# Patient Record
Sex: FEMALE | Race: WHITE
[De-identification: names, ages, dates, MRNs, and addresses within clinical notes are randomized per-mention and may not be internally consistent; named-entity substitution may affect disease eponyms.]

---

## 2018-04-22 ENCOUNTER — HOSPITAL ENCOUNTER (OUTPATIENT)
Dept: HOSPITAL 50 - VM.ED | Age: 83
Setting detail: OBSERVATION
LOS: 2 days | Discharge: HOME | End: 2018-04-24
Attending: PHYSICIAN ASSISTANT | Admitting: PHYSICIAN ASSISTANT
Payer: MEDICARE

## 2018-04-22 DIAGNOSIS — Z88.1: ICD-10-CM

## 2018-04-22 DIAGNOSIS — Z88.2: ICD-10-CM

## 2018-04-22 DIAGNOSIS — I12.9: ICD-10-CM

## 2018-04-22 DIAGNOSIS — E03.9: ICD-10-CM

## 2018-04-22 DIAGNOSIS — E11.22: ICD-10-CM

## 2018-04-22 DIAGNOSIS — Z79.899: ICD-10-CM

## 2018-04-22 DIAGNOSIS — Z91.09: ICD-10-CM

## 2018-04-22 DIAGNOSIS — N18.3: ICD-10-CM

## 2018-04-22 DIAGNOSIS — Z88.8: ICD-10-CM

## 2018-04-22 DIAGNOSIS — E86.0: ICD-10-CM

## 2018-04-22 DIAGNOSIS — E78.00: ICD-10-CM

## 2018-04-22 DIAGNOSIS — Z88.0: ICD-10-CM

## 2018-04-22 DIAGNOSIS — H81.13: Primary | ICD-10-CM

## 2018-04-22 LAB
CHLORIDE SERPL-SCNC: 100 MMOL/L (ref 98–107)
SODIUM SERPL-SCNC: 137 MMOL/L (ref 136–145)

## 2018-04-22 PROCEDURE — G0378 HOSPITAL OBSERVATION PER HR: HCPCS

## 2018-04-22 RX ADMIN — POTASSIUM CHLORIDE SCH MEQ: 750 TABLET, FILM COATED, EXTENDED RELEASE ORAL at 19:25

## 2018-04-22 NOTE — EDM.PDOC
ED HPI GENERAL MEDICAL PROBLEM





- General


Chief Complaint: General


Stated Complaint: dizzy, forgetfulness


Time Seen by Provider: 04/22/18 10:52


Source of Information: Reports: Patient, Family


History Limitations: Reports: No Limitations





- History of Present Illness


INITIAL COMMENTS - FREE TEXT/NARRATIVE: 





PtEsteban presents to ER with complaints of vertigo. She has been experiencing this 

for several months but it is worse today. She denies striking her head. She has 

been seen by ENT and they suggested vestibular therapy for BPPV. She has so far 

refused to do it, as she doesn't want to "move her head and get dizzy".


She denies any chest pain or shortness of breath. No weakness. Denies any 

blurred vision, difficulty with speech or ambulation.


Location: Reports: Generalized





- Related Data


 Allergies











Allergy/AdvReac Type Severity Reaction Status Date / Time


 


adhesive tape Allergy  Other Verified 04/22/18 10:55


 


azithromycin Allergy  Other Verified 04/22/18 10:52


 


cephalexin Allergy  Other Verified 04/22/18 10:55


 


Cephalosporins Allergy  Other Verified 04/22/18 13:14


 


ciprofloxacin Allergy  Other Verified 04/22/18 10:55


 


codeine Allergy  Other Verified 04/22/18 10:55


 


doxycycline Allergy  Other Verified 04/22/18 10:55


 


erythromycin base Allergy  Other Verified 04/22/18 10:55


 


hydrocodone Allergy  Other Verified 04/22/18 10:55


 


hydrocortisone Allergy  Other Verified 04/22/18 10:55


 


lidocaine Allergy  Other Verified 04/22/18 10:55


 


metformin Allergy  Other Verified 04/22/18 10:55


 


oxymetazoline Allergy  Other Verified 04/22/18 10:55


 


Penicillins Allergy  Other Verified 04/22/18 10:55


 


piroxicam Allergy  Other Verified 04/22/18 10:55


 


sulfamethoxazole Allergy  Other Verified 04/22/18 10:55


 


zolpidem Allergy  Other Verified 04/22/18 10:55











Home Meds: 


 Home Meds





Levothyroxine [Sythroid] 100 mcg PO DAILY 04/22/18 [History]


Lisinopril/Hydrochlorothiazide [Zestoretic 20-12.5 mg Tablet] 12.5 mg PO DAILY 

04/22/18 [History]


Metoprolol Tartrate 50 mg PO BID 04/22/18 [History]


Potassium Chloride 10 meq PO BID 04/22/18 [History]


Trospium [Sanctura] 20 mg PO BID 04/22/18 [History]


atorvaSTATin [Lipitor] 0.5 tab PO DAILY 04/22/18 [History]











Past Medical History


HEENT History: Reports: Allergic Rhinitis


Cardiovascular History: Reports: High Cholesterol, Hypertension


Genitourinary History: Reports: Other (See Below)


Other Genitourinary History: Stage III chronic kidney disease


Neurological History: Reports: Vertigo


Other Neuro History: benign paroxysmal positional vertigo


Psychiatric History: Reports: Anxiety, Depression


Endocrine/Metabolic History: Reports: Diabetes, Type II, Hypothyroidism





Social & Family History





- Tobacco Use


Smoking Status *Q: Never Smoker


Second Hand Smoke Exposure: No





- Caffeine Use


Caffeine Use: Reports: Coffee





- Recreational Drug Use


Recreational Drug Use: No





ED ROS GENERAL





- Review of Systems


Review Of Systems: See Below


Constitutional: Reports: No Symptoms


HEENT: Reports: No Symptoms


Respiratory: Reports: No Symptoms


Cardiovascular: Reports: No Symptoms


Endocrine: Reports: No Symptoms


GI/Abdominal: Reports: No Symptoms


: Reports: No Symptoms


Musculoskeletal: Reports: No Symptoms


Skin: Reports: No Symptoms


Neurological: Reports: No Symptoms


Psychiatric: Reports: No Symptoms


Hematologic/Lymphatic: Reports: Other


Immunologic: Reports: No Symptoms





ED EXAM, GENERAL





- Physical Exam


Exam: See Below


Exam Limited By: No Limitations


General Appearance: Alert, WD/WN, No Apparent Distress


Eye Exam: Bilateral Eye: EOMI, Normal Fundi, Normal Inspection, PERRL


Ears: Normal External Exam, Normal Canal, Hearing Grossly Normal, Normal TMs


Ear Exam: Bilateral Ear: Auricle Normal, Canal Normal, TM normal


Nose: Normal Inspection, Normal Mucosa, No Blood


Throat/Mouth: Normal Inspection, Normal Lips, Normal Teeth, Normal Gums, Normal 

Oropharynx, Normal Voice, No Airway Compromise


Head: Atraumatic, Normocephalic


Neck: Normal Inspection, Supple, Non-Tender, Full Range of Motion


Respiratory/Chest: No Respiratory Distress, Lungs Clear, Normal Breath Sounds, 

No Accessory Muscle Use, Chest Non-Tender


Cardiovascular: Normal Peripheral Pulses, Regular Rate, Rhythm, No Edema, No 

Gallop, No JVD, No Murmur, No Rub


Peripheral Pulses: 4+: Radial (L), Radial (R), Femoral (L), Femoral (R)


GI/Abdominal: Normal Bowel Sounds, Soft, Non-Tender, No Organomegaly, No 

Distention, No Abnormal Bruit, No Mass


 (Female) Exam: Deferred


Rectal (Female) Exam: Deferred


Back Exam: Normal Inspection, Full Range of Motion, NT


Extremities: Normal Inspection, Normal Range of Motion, Non-Tender, Normal 

Capillary Refill, No Pedal Edema


Neurological: Alert, Oriented, CN II-XII Intact, Normal Cognition, Normal Gait, 

Normal Reflexes, No Motor/Sensory Deficits


Psychiatric: Normal Affect, Normal Mood


Skin Exam: Warm, Dry, Intact, Normal Color, No Rash


Lymphatic: No Adenopathy





EKG INTERPRETATION


Rhythm: NSR


Axis: Normal


P-Wave: Present


QRS: Normal


ST-T: Normal


QT: Normal





Course





- Vital Signs


Last Recorded V/S: 





 Last Vital Signs











Temp  36.7 C   04/22/18 16:22


 


Pulse  96   04/22/18 16:22


 


Resp  16   04/22/18 16:22


 


BP  140/70   04/22/18 16:22


 


Pulse Ox  93 L  04/22/18 16:22














- Orders/Labs/Meds


Orders: 





 Active Orders 24 hr











 Category Date Time Status


 


 Chest 1V Frontal [CR] Stat Exams  04/22/18 11:15 Taken


 


 Head wo Cont [CT] Stat Exams  04/22/18 11:14 Taken


 


 Sodium Chloride 0.9% [Saline Flush] Med  04/22/18 10:52 Active





 10 ml FLUSH ASDIRECTED PRN   


 


 Peripheral IV Insertion Adult [OM.PC] Routine Oth  04/22/18 11:13 Ordered








 Medication Orders





Dimenhydrinate (Driminate)  50 mg PO Q4H PRN


   PRN Reason: Dizziness


Lactated Ringer's (Ringers, Lactated)  1,000 mls @ 75 mls/hr IV ASDIRECTED [facility]


   Last Admin: 04/22/18 15:25  Dose: 75 mls/hr


Meclizine HCl (Antivert)  25 mg PO BID PRN


   PRN Reason: Dizziness


Ondansetron HCl (Zofran)  4 mg IVPUSH Q8H PRN


   PRN Reason: Nausea


Sodium Chloride (Saline Flush)  10 ml FLUSH ASDIRECTED PRN


   PRN Reason: Keep Vein Open








Labs: 





 Laboratory Tests











  04/22/18 04/22/18 04/22/18 Range/Units





  11:25 11:25 11:25 


 


WBC  6.4    (4.0-10.0)  x10^3/uL


 


RBC  4.10    (4.00-5.50)  x10^6/uL


 


Hgb  12.4    (12.0-16.0)  g/dL


 


Hct  37.3    (33.0-47.0)  %


 


MCV  91.0    (78.0-93.0)  fL


 


MCH  30.2    (26.0-32.0)  pg


 


MCHC  33.2    (32.0-36.0)  g/dL


 


RDW Coeff of Mat  14.3    (10.0-15.0)  %


 


Plt Count  180    (130-400)  x10^3/uL


 


Neut % (Auto)  68.5    (50.0-80.0)  %


 


Lymph % (Auto)  17.6 L    (25.0-50.0)  %


 


Mono % (Auto)  8.6    (2.0-11.0)  %


 


Eos % (Auto)  4.8 H    (0.0-4.0)  %


 


Baso % (Auto)  0.5    (0.2-1.2)  %


 


PT   11.2   (9.8-11.8)  SEC


 


INR   1.0 L   (2.0-3.5)  


 


Sodium    137  (136-145)  mmol/L


 


Potassium    3.9  (3.5-5.1)  mmol/L


 


Chloride    100  ()  mmol/L


 


Carbon Dioxide    26  (21-32)  mmol/L


 


Anion Gap    14.9  (10-20)  mmol/L


 


BUN    17  (7-18)  mg/dL


 


Creatinine    1.3 H  (0.55-1.02)  mg/dL


 


Est Cr Clr Drug Dosing    TNP  


 


Estimated GFR (MDRD)    39  


 


Glucose    205 H  ()  mg/dL


 


Calcium    8.9  (8.5-10.1)  mg/dL


 


Corrected Calcium    9.54  (8.5-10.1)  mg/dL


 


Phosphorus    3.4  (2.6-4.7)  mg/dL


 


Magnesium    1.8  (1.8-2.4)  mg/dL


 


Total Bilirubin    0.7  (0.2-1.0)  mg/dL


 


AST    19  (15-37)  U/L


 


ALT    20  (14-59)  U/L


 


Alkaline Phosphatase    168 H  ()  U/L


 


Troponin I    < 0.017  (<=0.056)  ng/mL


 


C-Reactive Protein    1.3 H  (<=0.9)  mg/dL


 


Total Protein    6.8  (6.4-8.2)  g/dL


 


Albumin    3.2 L  (3.4-5.0)  g/dL


 


Globulin    3.6  


 


Albumin/Globulin Ratio    0.89  


 


TSH, Ultra Sensitive    2.013  (0.358-3.74)  uIU/mL











Meds: 





Medications











Generic Name Dose Route Start Last Admin





  Trade Name Freq  PRN Reason Stop Dose Admin


 


Dimenhydrinate  50 mg  04/22/18 14:06  





  Driminate  PO   





  Q4H PRN   





  Dizziness   





     





     





     


 


Lactated Ringer's  1,000 mls @ 75 mls/hr  04/22/18 14:15  04/22/18 15:25





  Ringers, Lactated  IV   75 mls/hr





  ASDIRECTED HUSSAIN   Administration





     





     





     





     


 


Meclizine HCl  25 mg  04/22/18 14:08  





  Antivert  PO   





  BID PRN   





  Dizziness   





     





     





     


 


Ondansetron HCl  4 mg  04/22/18 14:09  





  Zofran  IVPUSH   





  Q8H PRN   





  Nausea   





     





     





     


 


Sodium Chloride  10 ml  04/22/18 10:52  





  Saline Flush  FLUSH   





  ASDIRECTED PRN   





  Keep Vein Open   





     





     





     














Discontinued Medications














Generic Name Dose Route Start Last Admin





  Trade Name Freq  PRN Reason Stop Dose Admin


 


Dimenhydrinate  50 mg  04/22/18 11:16  04/22/18 11:24





  Driminate  PO  04/22/18 11:17  50 mg





  ONETIME ONE   Administration





     





     





     





     


 


Lactated Ringer's  1,000 mls @ 500 mls/hr  04/22/18 11:30  04/22/18 11:25





  Ringers, Lactated  IV   500 mls/hr





  ASDIRECTED HUSSAIN   Administration





     





     





     





     


 


Meclizine HCl  25 mg  04/22/18 11:16  04/22/18 11:24





  Antivert  PO  04/22/18 11:17  25 mg





  ONETIME ONE   Administration





     





     





     





     


 


Ondansetron HCl  4 mg  04/22/18 11:16  04/22/18 11:25





  Zofran  IVPUSH  04/22/18 11:17  4 mg





  ONETIME ONE   Administration





     





     





     





     














- Radiology Interpretation


Free Text/Narrative:: 





CT brain negative for acute pathology








Departure





- Departure


Time of Disposition: 12:37


Disposition: Admitted As Inpatient 66


Clinical Impression: 


 Dehydration, BPPV (benign paroxysmal positional vertigo)








- Discharge Information





- My Orders


Last 24 Hours: 





My Active Orders





04/22/18 10:52


Sodium Chloride 0.9% [Saline Flush]   10 ml FLUSH ASDIRECTED PRN 





04/22/18 11:13


Peripheral IV Insertion Adult [OM.PC] Routine 





04/22/18 11:14


Head wo Cont [CT] Stat 





04/22/18 11:15


Chest 1V Frontal [CR] Stat 














- Assessment/Plan


Last 24 Hours: 





My Active Orders





04/22/18 10:52


Sodium Chloride 0.9% [Saline Flush]   10 ml FLUSH ASDIRECTED PRN 





04/22/18 11:13


Peripheral IV Insertion Adult [OM.PC] Routine 





04/22/18 11:14


Head wo Cont [CT] Stat 





04/22/18 11:15


Chest 1V Frontal [CR] Stat

## 2018-04-23 LAB
CHLORIDE SERPL-SCNC: 104 MMOL/L (ref 98–107)
SODIUM SERPL-SCNC: 139 MMOL/L (ref 136–145)

## 2018-04-23 RX ADMIN — POTASSIUM CHLORIDE SCH MEQ: 750 TABLET, FILM COATED, EXTENDED RELEASE ORAL at 19:43

## 2018-04-23 RX ADMIN — POTASSIUM CHLORIDE SCH MEQ: 750 TABLET, FILM COATED, EXTENDED RELEASE ORAL at 07:19

## 2018-04-23 NOTE — PCM.PN
- General Info


Date of Service: 04/23/18


Subjective Update: 





Pt. is feeling somewhat better today. Her Sanctura was stopped as this is an 

anticholinergic medication. She saw PT this AM and the [place]-Hallpike tests were 

positive bilaterally. PT did the Epley maneuver with the pt. which did provoke 

vertigo nausea and vomiting. Pt. was given nausea zofran and with be worked 

with this afternoon from a vestibular standpoint.


Pt. is eating. No vomiting or diarrhea.


Functional Status: Reports: Pain Controlled





- Review of Systems


General: Reports: No Symptoms


HEENT: Reports: No Symptoms


Pulmonary: Reports: No Symptoms


Cardiovascular: Reports: No Symptoms


Gastrointestinal: Reports: No Symptoms


Genitourinary: Reports: No Symptoms


Musculoskeletal: Reports: No Symptoms


Skin: Reports: No Symptoms


Neurological: Reports: Other (positive marya-hallpike. Denies paresthesia or 

dysarthria.)


Psychiatric: Reports: No Symptoms





- Patient Data


Vitals - Most Recent: 


 Last Vital Signs











Temp  36.6 C   04/23/18 13:54


 


Pulse  65   04/23/18 13:54


 


Resp  16   04/23/18 13:54


 


BP  125/58 L  04/23/18 13:54


 


Pulse Ox  93 L  04/23/18 13:54











Weight - Most Recent: 68.152 kg


I&O - Last 24 Hours: 


 Intake & Output











 04/23/18 04/23/18 04/23/18





 06:59 14:59 22:59


 


Intake Total 1352 660 


 


Output Total 1400 100 900


 


Balance -48 560 -900











Lab Results Last 24 Hours: 


 Laboratory Results - last 24 hr











  04/22/18 04/23/18 04/23/18 Range/Units





  16:30 06:40 06:40 


 


WBC   7.1   (4.0-10.0)  x10^3/uL


 


RBC   3.58 L   (4.00-5.50)  x10^6/uL


 


Hgb   10.9 L D   (12.0-16.0)  g/dL


 


Hct   33.7   (33.0-47.0)  %


 


MCV   94.1 H D   (78.0-93.0)  fL


 


MCH   30.4   (26.0-32.0)  pg


 


MCHC   32.3   (32.0-36.0)  g/dL


 


RDW Coeff of Mat   14.3   (10.0-15.0)  %


 


Plt Count   183   (130-400)  x10^3/uL


 


Neut % (Auto)   61.5   (50.0-80.0)  %


 


Lymph % (Auto)   25.0   (25.0-50.0)  %


 


Mono % (Auto)   8.7   (2.0-11.0)  %


 


Eos % (Auto)   4.5 H   (0.0-4.0)  %


 


Baso % (Auto)   0.3   (0.2-1.2)  %


 


Sodium    139  (136-145)  mmol/L


 


Potassium    4.5  (3.5-5.1)  mmol/L


 


Chloride    104  ()  mmol/L


 


Carbon Dioxide    30  (21-32)  mmol/L


 


Anion Gap    9.5 L  (10-20)  mmol/L


 


BUN    15  (7-18)  mg/dL


 


Creatinine    1.2 H  (0.55-1.02)  mg/dL


 


Est Cr Clr Drug Dosing    22.83  mL/min


 


Estimated GFR (MDRD)    42  


 


Glucose    155 H  ()  mg/dL


 


Calcium    8.5  (8.5-10.1)  mg/dL


 


Corrected Calcium    9.62  (8.5-10.1)  mg/dL


 


Total Bilirubin    0.6  (0.2-1.0)  mg/dL


 


AST    17  (15-37)  U/L


 


ALT    18  (14-59)  U/L


 


Alkaline Phosphatase    135 H  ()  U/L


 


Total Protein    5.7 L  (6.4-8.2)  g/dL


 


Albumin    2.6 L  (3.4-5.0)  g/dL


 


Globulin    3.1  


 


Albumin/Globulin Ratio    0.84  


 


Urine Color  Yellow    (YELLOW)  


 


Urine Appearance  Slightly cloudy H    (CLEAR)  


 


Urine pH  5.0    (5.0-8.0)  


 


Ur Specific Gravity  <=1.005    


 


Urine Protein  Negative    (NEGATIVE)  mg/dL


 


Urine Glucose (UA)  Negative    (NEGATIVE)  mg/dL


 


Urine Ketones  Negative    (NEGATIVE)  mg/dL


 


Urine Occult Blood  Trace-lysed H    (NEGATIVE)  


 


Urine Nitrite  Negative    (NEGATIVE)  


 


Urine Bilirubin  Negative    (NEGATIVE)  


 


Urine Urobilinogen  0.2    (0.2)  EU/dL


 


Ur Leukocyte Esterase  Trace H    (NEGATIVE)  


 


Urine RBC  0-5    (NOT SEEN)  /HPF


 


Urine WBC  0-5    (NOT SEEN)  /HPF


 


Ur Squamous Epith Cells  Few H    (NEGATIVE)  /HPF


 


Amorphous Sediment  Few    


 


Urine Bacteria  Few H    (NEGATIVE)  /HPF


 


Urine Mucus  Rare H    (NEGATIVE)  /LPF











Med Orders - Current: 


 Current Medications





Atorvastatin Calcium (Lipitor)  5 mg PO DAILY [facility]


   Last Admin: 04/23/18 07:20 Dose:  5 mg


Dimenhydrinate (Driminate)  50 mg PO Q4H PRN


   PRN Reason: Dizziness


   Last Admin: 04/22/18 19:25 Dose:  50 mg


Hydrochlorothiazide (Hydrochlorothiazide)  12.5 mg PO DAILY [facility]


   Last Admin: 04/23/18 07:20 Dose:  12.5 mg


Levothyroxine Sodium (Synthroid)  100 mcg PO DAILY@0700 [facility]


Lisinopril (Prinivil)  20 mg PO DAILY [facility]


   Last Admin: 04/23/18 07:20 Dose:  20 mg


Meclizine HCl (Antivert)  25 mg PO BID PRN


   PRN Reason: Dizziness


   Last Admin: 04/23/18 07:20 Dose:  25 mg


Metoclopramide HCl (Reglan)  5 mg IVPUSH Q4H PRN


   PRN Reason: Nausea/Vomiting


   Last Admin: 04/23/18 15:19 Dose:  5 mg


Metoprolol Tartrate (Lopressor)  50 mg PO BID [facility]


   Last Admin: 04/23/18 07:19 Dose:  50 mg


Ondansetron HCl (Zofran)  4 mg IVPUSH Q8H PRN


   PRN Reason: Nausea


Ondansetron HCl (Zofran)  4 mg IVPUSH Q8H PRN


   PRN Reason: Nausea


Potassium Chloride (Klor-Con 10)  10 meq PO BID [facility]


   Last Admin: 04/23/18 07:19 Dose:  10 meq


Sodium Chloride (Saline Flush)  10 ml FLUSH ASDIRECTED PRN


   PRN Reason: Keep Vein Open





Discontinued Medications





Dimenhydrinate (Driminate)  50 mg PO ONETIME ONE


   Stop: 04/22/18 11:17


   Last Admin: 04/22/18 11:24 Dose:  50 mg


Lactated Ringer's (Ringers, Lactated)  1,000 mls @ 500 mls/hr IV ASDIRECTED [facility]


   Last Admin: 04/22/18 11:25 Dose:  500 mls/hr


Lactated Ringer's (Ringers, Lactated)  1,000 mls @ 75 mls/hr IV ASDIRECTED [facility]


   Last Admin: 04/23/18 05:16 Dose:  75 mls/hr


Ibuprofen (Motrin)  400 mg PO ONETIME STA


   Stop: 04/23/18 05:06


   Last Admin: 04/23/18 05:19 Dose:  400 mg


Levothyroxine Sodium (Synthroid)  100 mcg PO ACBRK HUSSAIN


   Last Admin: 04/23/18 06:27 Dose:  100 mcg


Meclizine HCl (Antivert)  25 mg PO ONETIME ONE


   Stop: 04/22/18 11:17


   Last Admin: 04/22/18 11:24 Dose:  25 mg


Ondansetron HCl (Zofran)  4 mg IVPUSH ONETIME ONE


   Stop: 04/22/18 11:17


   Last Admin: 04/22/18 11:25 Dose:  4 mg


Ondansetron HCl (Zofran)  4 mg IVPUSH ONETIME ONE


   Stop: 04/23/18 10:25


   Last Admin: 04/23/18 10:40 Dose:  4 mg











- Exam


Quality Assessment: No: Skin Breakdown


General: Alert, Oriented


HEENT: Pupils Equal, Pupils Reactive, EOMI, Mucous Membr. Moist/Pink


Neck: Supple


Lungs: Clear to Auscultation, Normal Respiratory Effort


Cardiovascular: Regular Rate, Regular Rhythm


GI/Abdominal Exam: Normal Bowel Sounds, Soft, Non-Tender, No Organomegaly, No 

Distention, No Abnormal Bruit, No Mass, Pelvis Stable


 (Female) Exam: Deferred


Back Exam: Normal Inspection, Full Range of Motion


Extremities: Normal Inspection, Normal Range of Motion, Non-Tender, No Pedal 

Edema, Normal Capillary Refill


Peripheral Pulses: 4+: Radial (L), Radial (R)


Skin: Warm, Dry, Intact


Neurological: No New Focal Deficit


Psy/Mental Status: Alert, Normal Affect, Normal Mood





- Problem List & Annotations


(1) BPPV (benign paroxysmal positional vertigo)


SNOMED Code(s): 045656182


   Code(s): H81.10 - BENIGN PAROXYSMAL VERTIGO, UNSPECIFIED EAR   Status: Acute

   Current Visit: Yes   





(2) CKD (chronic kidney disease) stage 3, GFR 30-59 ml/min


SNOMED Code(s): 824312867


   Code(s): N18.3 - CHRONIC KIDNEY DISEASE, STAGE 3 (MODERATE)   Status: 

Chronic   Current Visit: Yes   





- Problem List Review


Problem List Initiated/Reviewed/Updated: Yes





- My Orders


Last 24 Hours: 


My Active Orders





04/22/18 20:00


Metoprolol Tartrate [Lopressor]   50 mg PO BID 


Potassium Chloride [Klor-Con 10]   10 meq PO BID 





04/22/18 Dinner


Regular Diet [DIET] 





04/23/18 08:00


Hydrochlorothiazide   12.5 mg PO DAILY 


Lisinopril [Prinivil]   20 mg PO DAILY 


atorvaSTATin [Lipitor]   5 mg PO DAILY 





04/23/18 13:52


Metoclopramide [Reglan]   5 mg IVPUSH Q4H PRN 





04/23/18 13:53


Ondansetron [Zofran]   4 mg IVPUSH Q8H PRN 





04/24/18 07:00


Levothyroxine [Synthroid]   100 mcg PO DAILY@0700 














- Assessment


Assessment:: 





BPPV








- Plan


Plan:: 





Anticipate discharge tomorrow. PT will work with pt. this afternoon and 

tomorrow. We will premedicate the pt. with zofran or reglan prior to therapy.


Will continue with reglan and zofran for nausea and dimenhydrinate and 

meclizine for vertigo.


Discussed findings with family and pt. at length.

## 2018-04-24 RX ADMIN — POTASSIUM CHLORIDE SCH MEQ: 750 TABLET, FILM COATED, EXTENDED RELEASE ORAL at 07:51

## 2018-04-24 NOTE — [id number]
**Discharge Summary





- Hospital Course


HPI Initial Comments: 


Pt. presented to ER two days ago with complaints of vertigo. She has been 

experiencing this for one month since she increased her bladder medication, but 

it is worse today. She denies and head injury or trauma. She has been seen by 

ENT and they suggested vestibular therapy for BPPV. She has so far refused to 

do it, as she doesn't want to "move her head and get dizzy".


She denies any chest pain or shortness of breath. No weakness. Denies any 

blurred vision, difficulty with speech or ambulation. Patient had her bladder 

medication increased about one month ago. She also had her Metformin stopped 

due to diarrhea.





- Discharge Data


Discharge Date: 04/24/18


Discharge Disposition: Home, Self-Care 01


Condition: Good





- Discharge Diagnosis/Problem(s)


(1) BPPV (benign paroxysmal positional vertigo)


SNOMED Code(s): 660367796


   ICD Code: H81.10 - BENIGN PAROXYSMAL VERTIGO, UNSPECIFIED EAR   Status: 

Acute   Priority: High   Current Visit: Yes   


Qualifiers: 


   Laterality: bilateral   Qualified Code(s): H81.13 - Benign paroxysmal vertigo

, bilateral   





(2) Dehydration


SNOMED Code(s): 94493600


   ICD Code: E86.0 - DEHYDRATION   Status: Acute   Priority: Medium   Current 

Visit: Yes   





(3) DM2 (diabetes mellitus, type 2)


SNOMED Code(s): 19593493


   ICD Code: E11.9 - TYPE 2 DIABETES MELLITUS WITHOUT COMPLICATIONS   Status: 

Chronic   Current Visit: No   


Qualifiers: 


   Diabetes mellitus long term insulin use: without long term use   Diabetes 

mellitus complication status: with kidney complications   Diabetes mellitus 

complication detail: with chronic kidney disease   Chronic kidney disease stage

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 

diabetic chronic kidney disease   





(4) CKD (chronic kidney disease) stage 3, GFR 30-59 ml/min


SNOMED Code(s): 102614542


   ICD Code: N18.3 - CHRONIC KIDNEY DISEASE, STAGE 3 (MODERATE)   Status: 

Chronic   Current Visit: No   





(5) Hypertension


SNOMED Code(s): 04095207


   ICD Code: I10 - ESSENTIAL (PRIMARY) HYPERTENSION   Status: Chronic   Current 

Visit: No   


Qualifiers: 


   Hypertension type: essential hypertension   Qualified Code(s): I10 - 

Essential (primary) hypertension   





(6) Hypothyroid


SNOMED Code(s): 19254815


   ICD Code: E03.9 - HYPOTHYROIDISM, UNSPECIFIED   Status: Chronic   Current 

Visit: No   


Qualifiers: 


   Hypothyroidism type: acquired   Qualified Code(s): E03.9 - Hypothyroidism, 

unspecified   





(7) Hypercholesterolemia


SNOMED Code(s): 51577197


   ICD Code: E78.00 - PURE HYPERCHOLESTEROLEMIA, UNSPECIFIED   Status: Chronic 

  Current Visit: No   





- Patient Summary/Data


Operative Procedure(s) Performed: None


Consults: 


 Consultations





04/22/18 14:09


PT Evaluation and Treatment [CONS] Routine 











Labs Pending at D/C: 


None


Recommended Follow-up Testing/Procedures: 


BPPV/Vestibular rehab with Physical Therapy


Planned Operative Procedure(s) after DC: None


Hospital Course: 


Overall, patient remained stable. She received IVF on admission. She also got 

Meclizine and Dramamine which seemed to help with her dizziness. She did very 

well with Physical therapy. No falls. No issues with urination or BM's. 

Tolerated diet ok. VSS. Afebrile.





- Patient Instructions


Diet: Diabetic Diet


Activity: No Strenuous Activities, Rest and Relax Today


Driving: Do Not Drive


Showering/Bathing: May Shower


Notify Provider of: Fever, Increased Pain, Swelling and Redness, Nausea and/or 

Vomiting


Other/Special Instructions: worsening dizziness





- Discharge Plan


Prescriptions/Med Rec: 


Meclizine [Antivert] 1 tab PO BID PRN 5 Days #10 tablet


 PRN Reason: Dizziness


Home Medications: 


 Home Meds





Levothyroxine [Synthroid] 100 mcg PO DAILY 04/22/18 [History]


Lisinopril/Hydrochlorothiazide [Zestoretic 20-12.5 mg Tablet] 1 tab PO DAILY 04/ 22/18 [History]


Metoprolol Tartrate 50 mg PO BID 04/22/18 [History]


Potassium Chloride 10 meq PO BIDMEALS 04/22/18 [History]


Trospium [Sanctura] 20 mg PO BID 04/22/18 [History]


atorvaSTATin [Lipitor] 5 mg PO DAILY 04/22/18 [History]


Meclizine [Antivert] 1 tab PO BID PRN 5 Days #10 tablet 04/24/18 [Rx]








Patient Handouts:  Dizziness, Easy-to-Read, Vertigo, Easy-to-Read


Referrals: 


Laly Chavarria NP [Primary Care Provider] - 04/27/18 11:00 am





- Discharge Summary/Plan Comment


DC Time >30 min.: Yes


Discharge Summary/Plan Comment: 


Patient will be discharged home today into the care of her niece. No changes 

with any home medications. She will continue to see Physical therapy as 

outpatient. Patient will have a follow up appointment with her PCP this Friday 

at 11am for a recheck. 





- General Info


Date of Service: 04/24/18


Admission Dx/Problem (Free Text: 


BPPV


Dehydration


Functional Status: Reports: Pain Controlled, Tolerating Diet, Ambulating, 

Urinating





- Review of Systems


General: Denies: Fever, Weakness, Chills


HEENT: Denies: Visual Changes


Pulmonary: Denies: Shortness of Breath, Cough


Cardiovascular: Denies: Chest Pain, Palpitations


Gastrointestinal: Denies: Abdominal Pain, Nausea, Vomiting


Skin: Reports: No Symptoms


Neurological: Denies: Dizziness, Headache





- Patient Data


Vitals - Most Recent: 


 Last Vital Signs











Temp  36.6 C   04/24/18 05:57


 


Pulse  77   04/24/18 07:51


 


Resp  16   04/24/18 05:57


 


BP  131/56 L  04/24/18 07:51


 


Pulse Ox  93 L  04/24/18 05:57











Weight - Most Recent: 68.152 kg


I&O - Last 24 hours: 


 Intake & Output











 04/23/18 04/24/18 04/24/18





 22:59 06:59 14:59


 


Intake Total 1230 560 180


 


Output Total 900 1450 


 


Balance 330 -890 180











Med Orders - Current: 


 Current Medications





Atorvastatin Calcium (Lipitor)  5 mg PO DAILY [facility]


   Last Admin: 04/24/18 07:51 Dose:  5 mg


Dimenhydrinate (Driminate)  50 mg PO Q4H PRN


   PRN Reason: Dizziness


   Last Admin: 04/22/18 19:25 Dose:  50 mg


Hydrochlorothiazide (Hydrochlorothiazide)  12.5 mg PO DAILY [facility]


   Last Admin: 04/24/18 07:51 Dose:  12.5 mg


Levothyroxine Sodium (Synthroid)  100 mcg PO DAILY@0700 [facility]


   Last Admin: 04/24/18 06:00 Dose:  100 mcg


Lisinopril (Prinivil)  20 mg PO DAILY [facility]


   Last Admin: 04/24/18 07:52 Dose:  20 mg


Meclizine HCl (Antivert)  25 mg PO BID PRN


   PRN Reason: Dizziness


   Last Admin: 04/23/18 19:43 Dose:  25 mg


Metoclopramide HCl (Reglan)  5 mg IVPUSH Q4H PRN


   PRN Reason: Nausea/Vomiting


   Last Admin: 04/24/18 07:51 Dose:  5 mg


Metoprolol Tartrate (Lopressor)  50 mg PO BID [facility]


   Last Admin: 04/24/18 07:51 Dose:  50 mg


Ondansetron HCl (Zofran)  4 mg IVPUSH Q8H PRN


   PRN Reason: Nausea


Ondansetron HCl (Zofran)  4 mg IVPUSH Q8H PRN


   PRN Reason: Nausea


Potassium Chloride (Klor-Con 10)  10 meq PO BID [facility]


   Last Admin: 04/24/18 07:51 Dose:  10 meq


Sodium Chloride (Saline Flush)  10 ml FLUSH ASDIRECTED PRN


   PRN Reason: Keep Vein Open


   Last Admin: 04/23/18 19:45 Dose:  10 ml





Discontinued Medications





Dimenhydrinate (Driminate)  50 mg PO ONETIME ONE


   Stop: 04/22/18 11:17


   Last Admin: 04/22/18 11:24 Dose:  50 mg


Lactated Ringer's (Ringers, Lactated)  1,000 mls @ 500 mls/hr IV ASDIRECTED [facility]


   Last Admin: 04/22/18 11:25 Dose:  500 mls/hr


Lactated Ringer's (Ringers, Lactated)  1,000 mls @ 75 mls/hr IV ASDIRECTED [facility]


   Last Admin: 04/23/18 05:16 Dose:  75 mls/hr


Ibuprofen (Motrin)  400 mg PO ONETIME STA


   Stop: 04/23/18 05:06


   Last Admin: 04/23/18 05:19 Dose:  400 mg


Levothyroxine Sodium (Synthroid)  100 mcg PO ACBRK [facility]


   Last Admin: 04/23/18 06:27 Dose:  100 mcg


Meclizine HCl (Antivert)  25 mg PO ONETIME ONE


   Stop: 04/22/18 11:17


   Last Admin: 04/22/18 11:24 Dose:  25 mg


Ondansetron HCl (Zofran)  4 mg IVPUSH ONETIME ONE


   Stop: 04/22/18 11:17


   Last Admin: 04/22/18 11:25 Dose:  4 mg


Ondansetron HCl (Zofran)  4 mg IVPUSH ONETIME ONE


   Stop: 04/23/18 10:25


   Last Admin: 04/23/18 10:40 Dose:  4 mg











- Exam


General: Reports: Alert, Oriented, Cooperative, No Acute Distress


HEENT: Reports: Pupils Equal, Pupils Reactive


Lungs: Reports: Clear to Auscultation, Normal Respiratory Effort


Cardiovascular: Reports: Regular Rate, Regular Rhythm


GI/Abdominal Exam: Normal Bowel Sounds, Soft, Non-Tender


Skin: Reports: Warm, Dry, Intact


Neurological: Reports: No New Focal Deficit





*Q Meaningful Use (DIS)





- VTE *Q


VTE Mechanical Contraindications *Q: At Risk for Falls